# Patient Record
Sex: FEMALE | Race: WHITE | NOT HISPANIC OR LATINO | URBAN - METROPOLITAN AREA
[De-identification: names, ages, dates, MRNs, and addresses within clinical notes are randomized per-mention and may not be internally consistent; named-entity substitution may affect disease eponyms.]

---

## 2019-04-12 ENCOUNTER — EMERGENCY (EMERGENCY)
Facility: HOSPITAL | Age: 19
LOS: 1 days | Discharge: HOME | End: 2019-04-12
Attending: STUDENT IN AN ORGANIZED HEALTH CARE EDUCATION/TRAINING PROGRAM
Payer: COMMERCIAL

## 2019-04-12 VITALS — WEIGHT: 130.07 LBS | HEIGHT: 63 IN

## 2019-04-12 LAB
ALBUMIN SERPL ELPH-MCNC: 4.7 G/DL — SIGNIFICANT CHANGE UP (ref 3.3–5.2)
ALP SERPL-CCNC: 91 U/L — SIGNIFICANT CHANGE UP (ref 40–120)
ALT FLD-CCNC: 24 U/L — SIGNIFICANT CHANGE UP
ANION GAP SERPL CALC-SCNC: 13 MMOL/L — SIGNIFICANT CHANGE UP (ref 5–17)
AST SERPL-CCNC: 39 U/L — HIGH
BASOPHILS # BLD AUTO: 0 K/UL — SIGNIFICANT CHANGE UP (ref 0–0.2)
BASOPHILS NFR BLD AUTO: 0.2 % — SIGNIFICANT CHANGE UP (ref 0–2)
BILIRUB SERPL-MCNC: <0.2 MG/DL — LOW (ref 0.4–2)
BUN SERPL-MCNC: 20 MG/DL — SIGNIFICANT CHANGE UP (ref 8–20)
CALCIUM SERPL-MCNC: 9.9 MG/DL — SIGNIFICANT CHANGE UP (ref 8.6–10.2)
CHLORIDE SERPL-SCNC: 101 MMOL/L — SIGNIFICANT CHANGE UP (ref 98–107)
CO2 SERPL-SCNC: 25 MMOL/L — SIGNIFICANT CHANGE UP (ref 22–29)
CREAT SERPL-MCNC: 0.88 MG/DL — SIGNIFICANT CHANGE UP (ref 0.5–1.3)
EOSINOPHIL # BLD AUTO: 0.1 K/UL — SIGNIFICANT CHANGE UP (ref 0–0.5)
EOSINOPHIL NFR BLD AUTO: 1.6 % — SIGNIFICANT CHANGE UP (ref 0–6)
GLUCOSE SERPL-MCNC: 92 MG/DL — SIGNIFICANT CHANGE UP (ref 70–115)
HCG SERPL-ACNC: <4 MIU/ML — SIGNIFICANT CHANGE UP
HCT VFR BLD CALC: 39.7 % — SIGNIFICANT CHANGE UP (ref 37–47)
HGB BLD-MCNC: 13.5 G/DL — SIGNIFICANT CHANGE UP (ref 12–16)
LYMPHOCYTES # BLD AUTO: 2.4 K/UL — SIGNIFICANT CHANGE UP (ref 1–4.8)
LYMPHOCYTES # BLD AUTO: 27.3 % — SIGNIFICANT CHANGE UP (ref 20–55)
MCHC RBC-ENTMCNC: 31 PG — SIGNIFICANT CHANGE UP (ref 27–31)
MCHC RBC-ENTMCNC: 34 G/DL — SIGNIFICANT CHANGE UP (ref 32–36)
MCV RBC AUTO: 91.1 FL — SIGNIFICANT CHANGE UP (ref 81–99)
MONOCYTES # BLD AUTO: 0.6 K/UL — SIGNIFICANT CHANGE UP (ref 0–0.8)
MONOCYTES NFR BLD AUTO: 7.3 % — SIGNIFICANT CHANGE UP (ref 3–10)
NEUTROPHILS # BLD AUTO: 5.5 K/UL — SIGNIFICANT CHANGE UP (ref 1.8–8)
NEUTROPHILS NFR BLD AUTO: 63.4 % — SIGNIFICANT CHANGE UP (ref 37–73)
PLATELET # BLD AUTO: 246 K/UL — SIGNIFICANT CHANGE UP (ref 150–400)
POTASSIUM SERPL-MCNC: 4.2 MMOL/L — SIGNIFICANT CHANGE UP (ref 3.5–5.3)
POTASSIUM SERPL-SCNC: 4.2 MMOL/L — SIGNIFICANT CHANGE UP (ref 3.5–5.3)
PROT SERPL-MCNC: 7.4 G/DL — SIGNIFICANT CHANGE UP (ref 6.6–8.7)
RBC # BLD: 4.36 M/UL — LOW (ref 4.4–5.2)
RBC # FLD: 12.6 % — SIGNIFICANT CHANGE UP (ref 11–15.6)
SODIUM SERPL-SCNC: 139 MMOL/L — SIGNIFICANT CHANGE UP (ref 135–145)
TROPONIN T SERPL-MCNC: <0.01 NG/ML — SIGNIFICANT CHANGE UP (ref 0–0.06)
WBC # BLD: 8.7 K/UL — SIGNIFICANT CHANGE UP (ref 4.8–10.8)
WBC # FLD AUTO: 8.7 K/UL — SIGNIFICANT CHANGE UP (ref 4.8–10.8)

## 2019-04-12 PROCEDURE — 71046 X-RAY EXAM CHEST 2 VIEWS: CPT | Mod: 26

## 2019-04-12 PROCEDURE — 93306 TTE W/DOPPLER COMPLETE: CPT

## 2019-04-12 PROCEDURE — 85027 COMPLETE CBC AUTOMATED: CPT

## 2019-04-12 PROCEDURE — 84484 ASSAY OF TROPONIN QUANT: CPT

## 2019-04-12 PROCEDURE — 93010 ELECTROCARDIOGRAM REPORT: CPT

## 2019-04-12 PROCEDURE — 71046 X-RAY EXAM CHEST 2 VIEWS: CPT

## 2019-04-12 PROCEDURE — 99220: CPT

## 2019-04-12 PROCEDURE — 93005 ELECTROCARDIOGRAM TRACING: CPT

## 2019-04-12 PROCEDURE — G0378: CPT

## 2019-04-12 PROCEDURE — 99284 EMERGENCY DEPT VISIT MOD MDM: CPT | Mod: 25

## 2019-04-12 PROCEDURE — 80053 COMPREHEN METABOLIC PANEL: CPT

## 2019-04-12 PROCEDURE — 84702 CHORIONIC GONADOTROPIN TEST: CPT

## 2019-04-12 PROCEDURE — 36415 COLL VENOUS BLD VENIPUNCTURE: CPT

## 2019-04-12 RX ORDER — ACETAMINOPHEN 500 MG
650 TABLET ORAL EVERY 6 HOURS
Qty: 0 | Refills: 0 | Status: DISCONTINUED | OUTPATIENT
Start: 2019-04-12 | End: 2019-04-17

## 2019-04-12 NOTE — ED ADULT TRIAGE NOTE - CHIEF COMPLAINT QUOTE
pt presents to ED stating that she felt like she was going to pass out.  denies syncope.  now c/o difficulty breathing. resp even and unlabored.  ls cta. pt presents to ED stating that she had sudden onset chest pains and felt like she was going to pass out.  denies syncope.  now c/o difficulty breathing. resp even and unlabored.  ls cta.

## 2019-04-12 NOTE — ED CDU PROVIDER INITIAL DAY NOTE - NS ED ROS FT
(+) SOB, chest pain (-) No fever/chills, No photophobia/eye pain/changes in vision, No ear pain/sore throat/dysphagia, no palpitations, no cough/wheeze/stridor, No abdominal pain, No N/V/D, no dysuria/frequency/discharge, No neck/back pain, no rash

## 2019-04-12 NOTE — ED STATDOCS - NS ED ROS FT
No fever/chills, No photophobia/eye pain/changes in vision, No ear pain/sore throat/dysphagia, No chest pain/palpitations, no SOB/cough/wheeze/stridor, No abdominal pain, No N/V/D, no dysuria/frequency/discharge, No neck/back pain, no rash, no changes in neurological status/function. (+) SOB (-) No fever/chills, No photophobia/eye pain/changes in vision, No ear pain/sore throat/dysphagia, No chest pain/palpitations, no cough/wheeze/stridor, No abdominal pain, No N/V/D, no dysuria/frequency/discharge, No neck/back pain, no rash, no changes in neurological status/function. (+) SOB, chest pain (-) No fever/chills, No photophobia/eye pain/changes in vision, No ear pain/sore throat/dysphagia, no palpitations, no cough/wheeze/stridor, No abdominal pain, No N/V/D, no dysuria/frequency/discharge, No neck/back pain, no rash

## 2019-04-12 NOTE — ED CDU PROVIDER INITIAL DAY NOTE - OBJECTIVE STATEMENT
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 Pt reports she felt a sudden onset of chest pain, SOB, blurry vision, loss of hearing; pt felt as if she was going to synopsize today while working out; states she was doing leg workouts at time of onset. Pt admits she's noticed similar symptoms recently whenever she does these leg workouts. States today her symptoms were the most intense than any other similar episodes. Per mother states when ot came home she kept c/o chest pain and head pressure. Denies syncopal episode. Denies other hx of surgical procedures. Denies use of PO OCP's. No further complaints at this time.

## 2019-04-12 NOTE — ED STATDOCS - OBJECTIVE STATEMENT
17 y/o F pt with no pert. hx presents to the ED c/o .  Denies .  No further complaints at this time. 17 y/o F pt with no pert. hx presents to the ED with mother c/o SOB starting today. Pt reports she felt a sudden onset of chest pain and felt as if she was going to synopsize. Denies syncopal episode. No further complaints at this time. 19 y/o F pt with hx of x2 ECL surgeries presents to the ED with mother c/o chest pain and SOB starting today. Pt reports she felt a sudden onset of chest pain, SOB, blurry vision, loss of hearing; pt felt as if she was going to synopsize today while working out; states she was doing leg workouts at time of onset. Pt admits she's noticed similar symptoms recently whenever she does these leg workouts. States today her symptoms were the most intense than any other similar episodes. Per mother states when ot came home she kept c/o chest pain and head pressure. Denies syncopal episode. Denies other hx of surgical procedures. Denies use of PO OCP's. No further complaints at this time.

## 2019-04-12 NOTE — ED STATDOCS - PROGRESS NOTE DETAILS
KASHMIR Mcginnis NOTE: Pt evaluated at bedside. Pt has never seen cardiologist, no family hx of early cardiac death. Pt evaluated prior by intake physician. Otherwise HPI/PE/ROS as noted above. Will follow up plan per intake physician.

## 2019-04-12 NOTE — ED STATDOCS - ATTENDING CONTRIBUTION TO CARE
I performed a face to face history and physical exam of the patient and discussed their management with the resident/ACP. I reviewed the resident/ACP's note and agree with the documented findings and plan of care.    Pt with near syncopal episodes associated with squatting exercises only.  will put in CDU for TTE and reassessment.

## 2019-04-12 NOTE — ED STATDOCS - CLINICAL SUMMARY MEDICAL DECISION MAKING FREE TEXT BOX
19 y/o F with near syncopal episode today while at gym  -Will check labs  -Will follow up and re-evaluate patient

## 2019-04-13 VITALS
OXYGEN SATURATION: 100 % | SYSTOLIC BLOOD PRESSURE: 101 MMHG | DIASTOLIC BLOOD PRESSURE: 63 MMHG | TEMPERATURE: 98 F | HEART RATE: 64 BPM | RESPIRATION RATE: 19 BRPM

## 2019-04-13 PROCEDURE — 93306 TTE W/DOPPLER COMPLETE: CPT | Mod: 26

## 2019-04-13 PROCEDURE — 99217: CPT

## 2019-04-13 NOTE — ED CDU PROVIDER DISPOSITION NOTE - CLINICAL COURSE
18 year old female with c/o chest pain while squatting doing exercise, heart score 1, labs unremarkable, EKG WNL, ECHO WNL, advised to f/u with outpatient cardiologist

## 2019-04-13 NOTE — ED CDU PROVIDER DISPOSITION NOTE - CARE PROVIDER_API CALL
Samuel Hickman)  Cardiology; Internal Medicine  [address] [place] [address], [address] [id number]  Phone: [phone]  Fax: [phone]  Follow Up Time:

## 2019-04-13 NOTE — ED CDU PROVIDER SUBSEQUENT DAY NOTE - MEDICAL DECISION MAKING DETAILS
18 year old female with no pmhx or shx, c/o chest pain while squating: labs, CXR, echo, EKG, f/u with outpatient cardiology for outpatient holter monitoring

## 2019-04-13 NOTE — ED ADULT NURSE REASSESSMENT NOTE - NS ED NURSE REASSESS COMMENT FT1
Assumed care of the patient at 0900. Report received from Xavier ONEILL. Patient currently in Echo testing. Patient to be moved to observation  unit cdu 10. Will assess patient when arrives to the unit.
Pt is resting in bed comfortably at this time, no apparent distress noted at this time. pt safety maintained. Pt denies any complaints at this time. pt educated on plan of care, plan of care taught back to RN. plan of care education deemed proficient through successful teach back. will continue to reeducate pt regarding plan of care.
Pt resting comfortably in bed with mother at bedside. Pt states no c/o pain or dizziness. Pt refused tylenol, states "I feel fine." pt educated on plan of care, pt able to successfully teach back plan of care to RN, RN will continue to reeducate pt during hospital stay.
VSS. Awaiting echo testing results. No c/o distress or pain at this time. Offered meal tray, patient refuses. Mother at the bedside. Patient aware of plan of care. Will continue to monitor.
patient is alert and verbal, denies pain and discomfort. no dizziness or lightheadedness. vital signs are stable. patient under observation, awaits Echo testing. Will continue to monitor.
Received patient from echo testing at 0930. Patient A&Ox3. Mother at the bedside. Patient A&Ox3. No s/s of distress or pain. Placed on CM. NSR on CM. VSS. Denies CP or SOB, dizziness or fatigue at this time. Lungs clear B/L on auscultation. Abdomen soft and nondistended. Ambulatory. Denies any symptoms now. All questions and concerns addressed. Patient offered meal tray, refused. Patient updated on plan of care, patient in understanding of plan of care. Patient with no further questions for the nurse. Awaiting Echo test results.

## 2019-04-13 NOTE — ED CDU PROVIDER SUBSEQUENT DAY NOTE - HISTORY
This is a 17 y/o F p/w mother c/o chest pain near syncope on going x 3 months, worsening today.  She reports she felt a sudden onset of chest pain, SOB, blurry vision, loss of hearing; pt felt as if she was going to synopsize today while working out; states she was doing leg workouts at time of onset. Pt admits she's noticed similar symptoms recently whenever she does these leg workouts. States today her symptoms were the most intense than any other similar episodes. Per mother states when ot came home she kept c/o chest pain and head pressure. Denies syncopal episode. Denies use of PO OCP's. No further complaints at this time.

## 2019-04-13 NOTE — ED ADULT NURSE NOTE - CHIEF COMPLAINT QUOTE
pt presents to ED stating that she had sudden onset chest pains and felt like she was going to pass out.  denies syncope.  now c/o difficulty breathing. resp even and unlabored.  ls cta.

## 2019-04-13 NOTE — ED ADULT NURSE NOTE - NSIMPLEMENTINTERV_GEN_ALL_ED
Implemented All Universal Safety Interventions:  [place] to call system. Call bell, personal items and telephone within reach. Instruct patient to call for assistance. Room bathroom lighting operational. Non-slip footwear when patient is off stretcher. Physically safe environment: no spills, clutter or unnecessary equipment. Stretcher in lowest position, wheels locked, appropriate side rails in place.

## 2019-04-13 NOTE — ED ADULT NURSE NOTE - OBJECTIVE STATEMENT
Patient presents to ER complaining of dizziness and passing out while working out at the gym, patient states she was "out" for about 5 minutes, when woke up she was able to hear people around her, denies CP, resp even/unlabored, lungs CTAB.

## 2019-04-13 NOTE — ED CDU PROVIDER DISPOSITION NOTE - ATTENDING CONTRIBUTION TO CARE
seen with acp18 yo c/o dizziness while squating in the gym  no cardiac issues no medical problems  no family hx of cardiac issues no murmurs  PE is unremarkable ekg is wnl  echo ok  Agree with acps assessment hx and physical
